# Patient Record
Sex: MALE | Race: WHITE | Employment: UNEMPLOYED | URBAN - METROPOLITAN AREA
[De-identification: names, ages, dates, MRNs, and addresses within clinical notes are randomized per-mention and may not be internally consistent; named-entity substitution may affect disease eponyms.]

---

## 2020-01-01 ENCOUNTER — HOSPITAL ENCOUNTER (EMERGENCY)
Age: 50
End: 2020-10-14
Attending: EMERGENCY MEDICINE | Admitting: INTERNAL MEDICINE
Payer: COMMERCIAL

## 2020-01-01 ENCOUNTER — APPOINTMENT (OUTPATIENT)
Dept: GENERAL RADIOLOGY | Age: 50
End: 2020-01-01
Payer: COMMERCIAL

## 2020-01-01 VITALS
TEMPERATURE: 97.5 F | HEART RATE: 73 BPM | OXYGEN SATURATION: 97 % | DIASTOLIC BLOOD PRESSURE: 35 MMHG | SYSTOLIC BLOOD PRESSURE: 65 MMHG | WEIGHT: 120 LBS

## 2020-01-01 LAB
ABO/RH: NORMAL
ABSOLUTE EOS #: 0.24 K/UL (ref 0–0.44)
ABSOLUTE IMMATURE GRANULOCYTE: 4.48 K/UL (ref 0–0.3)
ABSOLUTE LYMPH #: 2.36 K/UL (ref 1.1–3.7)
ABSOLUTE MONO #: 0.94 K/UL (ref 0.1–1.2)
ALBUMIN SERPL-MCNC: 2.4 G/DL (ref 3.5–5.2)
ALBUMIN/GLOBULIN RATIO: ABNORMAL (ref 1–2.5)
ALP BLD-CCNC: 354 U/L (ref 40–129)
ALT SERPL-CCNC: 30 U/L (ref 5–41)
ANION GAP SERPL CALCULATED.3IONS-SCNC: 22 MMOL/L (ref 9–17)
ANTIBODY SCREEN: NEGATIVE
ARM BAND NUMBER: NORMAL
AST SERPL-CCNC: 153 U/L
BASOPHILS # BLD: 1 % (ref 0–2)
BASOPHILS ABSOLUTE: 0.24 K/UL (ref 0–0.2)
BILIRUB SERPL-MCNC: 2.77 MG/DL (ref 0.3–1.2)
BNP INTERPRETATION: ABNORMAL
BUN BLDV-MCNC: 79 MG/DL (ref 6–20)
BUN/CREAT BLD: 33 (ref 9–20)
CALCIUM SERPL-MCNC: 5.3 MG/DL (ref 8.6–10.4)
CHLORIDE BLD-SCNC: 89 MMOL/L (ref 98–107)
CO2: 20 MMOL/L (ref 20–31)
CREAT SERPL-MCNC: 2.36 MG/DL (ref 0.7–1.2)
DIFFERENTIAL TYPE: ABNORMAL
EKG ATRIAL RATE: 78 BPM
EKG Q-T INTERVAL: 434 MS
EKG QRS DURATION: 88 MS
EKG QTC CALCULATION (BAZETT): 536 MS
EKG R AXIS: 49 DEGREES
EKG T AXIS: 18 DEGREES
EKG VENTRICULAR RATE: 92 BPM
EOSINOPHILS RELATIVE PERCENT: 1 % (ref 1–4)
EXPIRATION DATE: NORMAL
FIO2: 100
GFR AFRICAN AMERICAN: 36 ML/MIN
GFR NON-AFRICAN AMERICAN: 30 ML/MIN
GFR SERPL CREATININE-BSD FRML MDRD: ABNORMAL ML/MIN/{1.73_M2}
GFR SERPL CREATININE-BSD FRML MDRD: ABNORMAL ML/MIN/{1.73_M2}
GLUCOSE BLD-MCNC: 140 MG/DL (ref 70–99)
HCO3 VENOUS: 20.7 MMOL/L (ref 24–30)
HCT VFR BLD CALC: 24 % (ref 40.7–50.3)
HEMOGLOBIN: 6.6 G/DL (ref 13–17)
IMMATURE GRANULOCYTES: 19 %
LACTIC ACID, SEPSIS WHOLE BLOOD: ABNORMAL MMOL/L (ref 0.5–1.9)
LACTIC ACID, SEPSIS WHOLE BLOOD: ABNORMAL MMOL/L (ref 0.5–1.9)
LACTIC ACID, SEPSIS: 5 MMOL/L (ref 0.5–1.9)
LACTIC ACID, SEPSIS: 7.2 MMOL/L (ref 0.5–1.9)
LYMPHOCYTES # BLD: 10 % (ref 24–43)
MAGNESIUM: 2.7 MG/DL (ref 1.6–2.6)
MCH RBC QN AUTO: 25.3 PG (ref 25.2–33.5)
MCHC RBC AUTO-ENTMCNC: 27.5 G/DL (ref 28.4–34.8)
MCV RBC AUTO: 92 FL (ref 82.6–102.9)
MONOCYTES # BLD: 4 % (ref 3–12)
MORPHOLOGY: ABNORMAL
MORPHOLOGY: ABNORMAL
NEGATIVE BASE EXCESS, VEN: 8 (ref 0–2)
NRBC AUTOMATED: 5.2 PER 100 WBC
O2 DEVICE/FLOW/%: ABNORMAL
O2 SAT, VEN: 61 %
PATIENT TEMP: 35.4
PCO2, VEN: 60 MM HG (ref 39–55)
PDW BLD-RTO: 21.3 % (ref 11.8–14.4)
PH VENOUS: 7.15 (ref 7.32–7.42)
PLATELET # BLD: 76 K/UL (ref 138–453)
PLATELET ESTIMATE: ABNORMAL
PMV BLD AUTO: 12 FL (ref 8.1–13.5)
PO2, VEN: 42 MM HG (ref 30–50)
POC PCO2 TEMP: 56 MM HG
POC PH TEMP: 7.17
POC PO2 TEMP: 37 MM HG
POSITIVE BASE EXCESS, VEN: ABNORMAL (ref 0–2)
POTASSIUM SERPL-SCNC: 5.6 MMOL/L (ref 3.7–5.3)
PRO-BNP: 8135 PG/ML
RBC # BLD: 2.61 M/UL (ref 4.21–5.77)
RBC # BLD: ABNORMAL 10*6/UL
SARS-COV-2, RAPID: NOT DETECTED
SARS-COV-2: NORMAL
SARS-COV-2: NORMAL
SEG NEUTROPHILS: 65 % (ref 36–65)
SEGMENTED NEUTROPHILS ABSOLUTE COUNT: 15.34 K/UL (ref 1.5–8.1)
SODIUM BLD-SCNC: 131 MMOL/L (ref 135–144)
SOURCE: NORMAL
TOTAL CO2, VENOUS: 23 MMOL/L (ref 25–31)
TOTAL PROTEIN: 5.7 G/DL (ref 6.4–8.3)
TROPONIN INTERP: ABNORMAL
TROPONIN INTERP: NORMAL
TROPONIN T: ABNORMAL NG/ML
TROPONIN T: NORMAL NG/ML
TROPONIN, HIGH SENSITIVITY: 21 NG/L (ref 0–22)
TROPONIN, HIGH SENSITIVITY: 24 NG/L (ref 0–22)
WBC # BLD: 23.6 K/UL (ref 3.5–11.3)
WBC # BLD: ABNORMAL 10*3/UL

## 2020-01-01 PROCEDURE — 83735 ASSAY OF MAGNESIUM: CPT

## 2020-01-01 PROCEDURE — 2580000003 HC RX 258: Performed by: EMERGENCY MEDICINE

## 2020-01-01 PROCEDURE — 82330 ASSAY OF CALCIUM: CPT

## 2020-01-01 PROCEDURE — 86850 RBC ANTIBODY SCREEN: CPT

## 2020-01-01 PROCEDURE — 96365 THER/PROPH/DIAG IV INF INIT: CPT

## 2020-01-01 PROCEDURE — 99291 CRITICAL CARE FIRST HOUR: CPT

## 2020-01-01 PROCEDURE — 87086 URINE CULTURE/COLONY COUNT: CPT

## 2020-01-01 PROCEDURE — 86900 BLOOD TYPING SEROLOGIC ABO: CPT

## 2020-01-01 PROCEDURE — 87186 SC STD MICRODIL/AGAR DIL: CPT

## 2020-01-01 PROCEDURE — 83880 ASSAY OF NATRIURETIC PEPTIDE: CPT

## 2020-01-01 PROCEDURE — 87205 SMEAR GRAM STAIN: CPT

## 2020-01-01 PROCEDURE — 84484 ASSAY OF TROPONIN QUANT: CPT

## 2020-01-01 PROCEDURE — 96367 TX/PROPH/DG ADDL SEQ IV INF: CPT

## 2020-01-01 PROCEDURE — 86901 BLOOD TYPING SEROLOGIC RH(D): CPT

## 2020-01-01 PROCEDURE — U0002 COVID-19 LAB TEST NON-CDC: HCPCS

## 2020-01-01 PROCEDURE — 87147 CULTURE TYPE IMMUNOLOGIC: CPT

## 2020-01-01 PROCEDURE — 71045 X-RAY EXAM CHEST 1 VIEW: CPT

## 2020-01-01 PROCEDURE — 86403 PARTICLE AGGLUT ANTBDY SCRN: CPT

## 2020-01-01 PROCEDURE — 93005 ELECTROCARDIOGRAM TRACING: CPT | Performed by: EMERGENCY MEDICINE

## 2020-01-01 PROCEDURE — 2500000003 HC RX 250 WO HCPCS: Performed by: EMERGENCY MEDICINE

## 2020-01-01 PROCEDURE — 81001 URINALYSIS AUTO W/SCOPE: CPT

## 2020-01-01 PROCEDURE — 6360000002 HC RX W HCPCS: Performed by: EMERGENCY MEDICINE

## 2020-01-01 PROCEDURE — 36600 WITHDRAWAL OF ARTERIAL BLOOD: CPT

## 2020-01-01 PROCEDURE — 85025 COMPLETE CBC W/AUTO DIFF WBC: CPT

## 2020-01-01 PROCEDURE — 80053 COMPREHEN METABOLIC PANEL: CPT

## 2020-01-01 PROCEDURE — 83605 ASSAY OF LACTIC ACID: CPT

## 2020-01-01 PROCEDURE — 96361 HYDRATE IV INFUSION ADD-ON: CPT

## 2020-01-01 PROCEDURE — 94770 HC ETCO2 MONITOR DAILY: CPT

## 2020-01-01 PROCEDURE — 87040 BLOOD CULTURE FOR BACTERIA: CPT

## 2020-01-01 PROCEDURE — 96368 THER/DIAG CONCURRENT INF: CPT

## 2020-01-01 PROCEDURE — 82803 BLOOD GASES ANY COMBINATION: CPT

## 2020-01-01 RX ORDER — MORPHINE SULFATE 2 MG/ML
2 INJECTION, SOLUTION INTRAMUSCULAR; INTRAVENOUS
Status: DISCONTINUED | OUTPATIENT
Start: 2020-01-01 | End: 2020-01-01 | Stop reason: HOSPADM

## 2020-01-01 RX ORDER — MORPHINE SULFATE 4 MG/ML
4 INJECTION, SOLUTION INTRAMUSCULAR; INTRAVENOUS
Status: DISCONTINUED | OUTPATIENT
Start: 2020-01-01 | End: 2020-01-01 | Stop reason: HOSPADM

## 2020-01-01 RX ORDER — ONDANSETRON 2 MG/ML
4 INJECTION INTRAMUSCULAR; INTRAVENOUS EVERY 6 HOURS PRN
Status: DISCONTINUED | OUTPATIENT
Start: 2020-01-01 | End: 2020-01-01 | Stop reason: HOSPADM

## 2020-01-01 RX ORDER — DEXTROSE MONOHYDRATE 25 G/50ML
25 INJECTION, SOLUTION INTRAVENOUS ONCE
Status: DISCONTINUED | OUTPATIENT
Start: 2020-01-01 | End: 2020-01-01

## 2020-01-01 RX ORDER — SODIUM CHLORIDE 0.9 % (FLUSH) 0.9 %
10 SYRINGE (ML) INJECTION PRN
Status: CANCELLED | OUTPATIENT
Start: 2020-01-01

## 2020-01-01 RX ORDER — ACETAMINOPHEN 325 MG/1
650 TABLET ORAL EVERY 6 HOURS PRN
Status: CANCELLED | OUTPATIENT
Start: 2020-01-01

## 2020-01-01 RX ORDER — PROMETHAZINE HYDROCHLORIDE 25 MG/1
12.5 TABLET ORAL EVERY 6 HOURS PRN
Status: CANCELLED | OUTPATIENT
Start: 2020-01-01

## 2020-01-01 RX ORDER — 0.9 % SODIUM CHLORIDE 0.9 %
1000 INTRAVENOUS SOLUTION INTRAVENOUS ONCE
Status: COMPLETED | OUTPATIENT
Start: 2020-01-01 | End: 2020-01-01

## 2020-01-01 RX ORDER — ACETAMINOPHEN 650 MG/1
650 SUPPOSITORY RECTAL EVERY 6 HOURS PRN
Status: CANCELLED | OUTPATIENT
Start: 2020-01-01

## 2020-01-01 RX ORDER — POLYETHYLENE GLYCOL 3350 17 G/17G
17 POWDER, FOR SOLUTION ORAL DAILY PRN
Status: CANCELLED | OUTPATIENT
Start: 2020-01-01

## 2020-01-01 RX ORDER — 0.9 % SODIUM CHLORIDE 0.9 %
250 INTRAVENOUS SOLUTION INTRAVENOUS ONCE
Status: COMPLETED | OUTPATIENT
Start: 2020-01-01 | End: 2020-01-01

## 2020-01-01 RX ORDER — DOPAMINE HYDROCHLORIDE 160 MG/100ML
2.5 INJECTION, SOLUTION INTRAVENOUS CONTINUOUS
Status: DISCONTINUED | OUTPATIENT
Start: 2020-01-01 | End: 2020-01-01

## 2020-01-01 RX ORDER — NICOTINE 21 MG/24HR
1 PATCH, TRANSDERMAL 24 HOURS TRANSDERMAL DAILY PRN
Status: CANCELLED | OUTPATIENT
Start: 2020-01-01

## 2020-01-01 RX ORDER — GLYCOPYRROLATE 1 MG/5 ML
0.2 SYRINGE (ML) INTRAVENOUS EVERY 4 HOURS PRN
Status: DISCONTINUED | OUTPATIENT
Start: 2020-01-01 | End: 2020-01-01 | Stop reason: HOSPADM

## 2020-01-01 RX ORDER — ONDANSETRON 2 MG/ML
4 INJECTION INTRAMUSCULAR; INTRAVENOUS EVERY 6 HOURS PRN
Status: CANCELLED | OUTPATIENT
Start: 2020-01-01

## 2020-01-01 RX ORDER — LORAZEPAM 2 MG/ML
1 INJECTION INTRAMUSCULAR
Status: DISCONTINUED | OUTPATIENT
Start: 2020-01-01 | End: 2020-01-01 | Stop reason: HOSPADM

## 2020-01-01 RX ORDER — SODIUM CHLORIDE 0.9 % (FLUSH) 0.9 %
10 SYRINGE (ML) INJECTION EVERY 12 HOURS SCHEDULED
Status: CANCELLED | OUTPATIENT
Start: 2020-01-01

## 2020-01-01 RX ADMIN — CALCIUM GLUCONATE 1 G: 98 INJECTION, SOLUTION INTRAVENOUS at 18:31

## 2020-01-01 RX ADMIN — Medication 2 MCG/MIN: at 17:23

## 2020-01-01 RX ADMIN — PIPERACILLIN SODIUM AND TAZOBACTAM SODIUM 4.5 G: 4; .5 INJECTION, POWDER, LYOPHILIZED, FOR SOLUTION INTRAVENOUS at 18:32

## 2020-01-01 RX ADMIN — LORAZEPAM 1 MG: 2 INJECTION, SOLUTION INTRAMUSCULAR; INTRAVENOUS at 22:16

## 2020-01-01 RX ADMIN — VANCOMYCIN HYDROCHLORIDE 1500 MG: 1 INJECTION, POWDER, LYOPHILIZED, FOR SOLUTION INTRAVENOUS at 19:48

## 2020-01-01 RX ADMIN — MORPHINE SULFATE 4 MG: 4 INJECTION, SOLUTION INTRAMUSCULAR; INTRAVENOUS at 22:17

## 2020-01-01 RX ADMIN — SODIUM CHLORIDE 1000 ML: 9 INJECTION, SOLUTION INTRAVENOUS at 17:25

## 2020-01-01 RX ADMIN — Medication 1 MG/HR: at 17:35

## 2020-01-01 RX ADMIN — MORPHINE SULFATE 4 MG: 4 INJECTION, SOLUTION INTRAMUSCULAR; INTRAVENOUS at 22:33

## 2020-01-01 RX ADMIN — Medication 0.2 MG: at 22:34

## 2020-01-01 RX ADMIN — SODIUM CHLORIDE 1000 ML: 9 INJECTION, SOLUTION INTRAVENOUS at 19:32

## 2020-01-01 RX ADMIN — SODIUM CHLORIDE 250 ML: 0.9 INJECTION, SOLUTION INTRAVENOUS at 18:41

## 2020-01-01 SDOH — HEALTH STABILITY: MENTAL HEALTH: HOW OFTEN DO YOU HAVE A DRINK CONTAINING ALCOHOL?: NEVER

## 2020-01-01 ASSESSMENT — PULMONARY FUNCTION TESTS
PIF_VALUE: 13
PIF_VALUE: 32

## 2020-10-13 PROBLEM — D64.9 ANEMIA: Status: ACTIVE | Noted: 2020-01-01

## 2020-10-13 PROBLEM — C79.51 SECONDARY MALIGNANT NEOPLASM OF BONE (HCC): Status: ACTIVE | Noted: 2019-09-20

## 2020-10-13 PROBLEM — E87.5 HYPERKALEMIA: Status: ACTIVE | Noted: 2020-01-01

## 2020-10-13 PROBLEM — E87.1 HYPONATREMIA: Status: ACTIVE | Noted: 2020-01-01

## 2020-10-13 PROBLEM — J18.9 PNEUMONIA DUE TO INFECTIOUS ORGANISM: Status: ACTIVE | Noted: 2020-01-01

## 2020-10-13 PROBLEM — E83.52 HYPERCALCEMIA: Status: ACTIVE | Noted: 2019-01-01

## 2020-10-13 PROBLEM — C64.1 RENAL CELL CARCINOMA OF RIGHT KIDNEY (HCC): Status: ACTIVE | Noted: 2019-09-09

## 2020-10-13 PROBLEM — E43 SEVERE MALNUTRITION (HCC): Status: ACTIVE | Noted: 2019-09-13

## 2020-10-13 PROBLEM — J96.90 RESPIRATORY FAILURE (HCC): Status: ACTIVE | Noted: 2020-01-01

## 2020-10-13 PROBLEM — J96.00 ACUTE RESPIRATORY FAILURE (HCC): Status: ACTIVE | Noted: 2020-01-01

## 2020-10-13 PROBLEM — R65.11 SIRS WITHOUT INFECTION WITH ORGAN DYSFUNCTION (HCC): Status: ACTIVE | Noted: 2020-01-01

## 2020-10-13 NOTE — ED PROVIDER NOTES
EMERGENCY DEPARTMENT ENCOUNTER    Pt Name: Judy Lawton  MRN: [id number]  [place] 1970  Date of evaluation: 10/13/20  CHIEF COMPLAINT     Unresponsiveness. HISTORY OF PRESENT ILLNESS   Patient is a 47-year-old male with history of terminal renal cancer brought in by EMS intubated. Patient reportedly had not been feeling well for the past couple days and family had been talking with hospice regarding possible transition to hospice care yesterday as well as a DNR. His cancer has reportedly metastasized to liver and bones. They had not yet come to a decision. The patient reportedly initially had chemo which helped but now he is on his third different chemo regimen and is oncologist had told him that he is now terminal.  When EMS arrived patient had agonal breathing and was unresponsive with a thready pulse. Sugar was reportedly normal.  No reported trauma. REVIEW OF SYSTEMS     Review of Systems   Unable to perform ROS: Acuity of condition     PASTMEDICAL HISTORY     Past Medical History:   Diagnosis Date    Cancer ([facility] [address]     Hypertension      SURGICAL HISTORY     History reviewed. No pertinent surgical history. CURRENT MEDICATIONS       Previous Medications    No medications on file     ALLERGIES     has No Known Allergies. FAMILY HISTORY     has no family status information on file. SOCIAL HISTORY       Social History     Tobacco Use    Smoking status: Former Smoker   Substance Use Topics    Alcohol use: Never     Frequency: Never    Drug use: Not on file     PHYSICAL EXAM     INITIAL VITALS: BP (!) 85/53   Pulse 88   Temp 97.5 °F (36.4 °C) (Rectal)   Resp 26   Wt 120 lb (54.4 kg)   SpO2 100%    Physical Exam  Vitals signs reviewed. Constitutional:       Appearance: He is ill-appearing, toxic-appearing and diaphoretic. Interventions: He is intubated. HENT:      Head: Normocephalic and atraumatic.       Comments: No raccoon eyes or khoury sign     Mouth/Throat:      Mouth: Mucous membranes are moist.   Eyes:      General: No scleral icterus. Pupils: Pupils are equal, round, and reactive to light. Comments: Pupils are about 5 bilaterally millimeters and sluggishly reactive. Corneal reflexes intact   Neck:      Comments: C-collar in place. Trachea midline. Cardiovascular:      Rate and Rhythm: Normal rate and regular rhythm. Pulses: Normal pulses. Heart sounds: Normal heart sounds. Comments: 2 intraosseous line needs to the bilateral tibias  Pulmonary:      Effort: He is intubated. Breath sounds: Rhonchi and rales present. Comments: Ventilator breath sounds bilaterally  Abdominal:      General: There is no distension. Palpations: Abdomen is soft. There is no mass. Musculoskeletal:         General: Swelling present. Right lower leg: Edema present. Left lower leg: Edema present. Comments: No step-offs or deformities to the cervical spine. Skin:     General: Skin is cool. Capillary Refill: Capillary refill takes 2 to 3 seconds. Coloration: Skin is pale. Neurological:      Mental Status: He is unresponsive. GCS: GCS eye subscore is 4. GCS verbal subscore is 1. GCS motor subscore is 1. Motor: No tremor or seizure activity. MEDICAL DECISION MAKING:          Please see ED Course below for MDM/ED course. DDx: PE, organ failure, CHF, infection, metabolic disturbance, intracranial bleed    All patient's question's and concerns were answered prior to disposition and patient and/or family expressed understanding and agreement of treatment plan. CRITICAL CARE:   CRITICAL CARE: There was a high probability of clinically significant/life threatening deterioration in this patient's condition which required my urgent intervention. Total critical care time was 32 minutes. This excludes any time for separately reportable procedures.         NIH STROKE SCALE: PROCEDURES:    Procedures    DIAGNOSTIC RESULTS   EKG:All EKG's are interpreted by the Emergency Department Physician who either signs or Co-signs this chart in the absence of a cardiologist.    Accelerated junctional rhythm with PVCs rate of 92 QRS 88  normal axis nonspecific ST and T wave changes no Q waves, good R wave progression, abnormal EKG    RADIOLOGY:All plain film, CT, MRI, and formal ultrasound images (except ED bedside ultrasound) are read by the radiologist, see reports below, unless otherwisenoted in MDM or here. XR CHEST 1 VIEW   Final Result   Endotracheal tube residing at the level of the thoracic inlet. This could be   advanced 3 cm for better positioning. Findings suggest airspace consolidation in the right lower lung field. Interstitial process, possibly fluid and/or fibrosis is also present. LABS: All lab results were reviewed by myself, and all abnormals are listed below.   Labs Reviewed   CBC WITH AUTO DIFFERENTIAL - Abnormal; Notable for the following components:       Result Value    WBC 23.6 (*)     RBC 2.61 (*)     Hemoglobin 6.6 (*)     Hematocrit 24.0 (*)     MCHC 27.5 (*)     RDW 21.3 (*)     Platelets 76 (*)     NRBC Automated 5.2 (*)     Lymphocytes 10 (*)     Immature Granulocytes 19 (*)     Segs Absolute 15.34 (*)     Basophils Absolute 0.24 (*)     Absolute Immature Granulocyte 4.48 (*)     All other components within normal limits   COMPREHENSIVE METABOLIC PANEL - Abnormal; Notable for the following components:    Glucose 140 (*)     BUN 79 (*)     CREATININE 2.36 (*)     Bun/Cre Ratio 33 (*)     Calcium 5.3 (*)     Sodium 131 (*)     Potassium 5.6 (*)     Chloride 89 (*)     Anion Gap 22 (*)     Alkaline Phosphatase 354 (*)      (*)     Total Bilirubin 2.77 (*)     Total Protein 5.7 (*)     Alb 2.4 (*)     GFR Non- 30 (*)     GFR  36 (*)     All other components within normal limits   MAGNESIUM - Abnormal; Notable for the following components:    Magnesium 2.7 (*)     All other components within normal limits   TROPONIN - Abnormal; Notable for the following components:    Troponin, High Sensitivity 24 (*)     All other components within normal limits   BRAIN NATRIURETIC PEPTIDE - Abnormal; Notable for the following components:    Pro-BNP 8,135 (*)     All other components within normal limits   LACTATE, SEPSIS - Abnormal; Notable for the following components:    Lactic Acid, Sepsis 7.2 (*)     All other components within normal limits   LACTATE, SEPSIS - Abnormal; Notable for the following components:    Lactic Acid, Sepsis 5.0 (*)     All other components within normal limits   POC PANEL (G3)-GIL - Abnormal; Notable for the following components:    pH, Gil 7.15 (*)     pCO2, Gil 60 (*)     Total CO2, Venous 23 (*)     HCO3, Venous 20.7 (*)     Negative Base Excess, Gil 8 (*)     All other components within normal limits   CULTURE, URINE   CULTURE, BLOOD 1   CULTURE, BLOOD 1   TROPONIN   COVID-19   URINALYSIS WITH MICROSCOPIC   CALCIUM, IONIZED   TYPE AND SCREEN       EMERGENCY DEPARTMENTCOURSE:     Patient is a 53-year-old male here after respiratory distress intubated by EMS. Apparently has been feeling well for the past few days has terminal kidney cancer per the family and was discussing hospice care. On exam he is pale he is cool he is diaphoretic he is intubated he is only 88% on 100% oxygen he is hypotensive currently on dopamine drip from EMS, will transition just to Levophed. He has bilateral ventilated breath sounds and diffuse rhonchi and crackles. He has a c-collar in place. His pupils are equal and sluggishly reactive with corneal reflexes intact. No signs of basilar skull fracture. No chest deformities or abdominal distention or pulsatile mass. Lower extremities very edematous bilaterally. He has IO was in place to both tibias.   Impression is shock, respiratory failure, will check cardiac metabolic and infectious work-up, will speak with family regarding their wishes regarding the patient's care and further disposition. Consider CT chest given he is cancer and hypotensive as well as CT head. Spoke with the wife and the son and updated them on the situation. They would like to see the patient and also speak with the rest of the family before they make further decisions regarding his care. We will start Levophed drip, patient is starting to wake up and move more will start Versed. 5:11 PM EDT  Patient is anemic at 6.6. Will type and screen. Will order CT chest to rule out PE as well as CT head given his unresponsiveness. Will order blood cultures as his white count is 23. Patient's pH is 7.14.    6:54 PM EDT  Patient is waking up and breathing on his own over the vent. Family updated on condition. They would like to continue with full code and full management. Antibiotics ordered for possible pneumonia. Code swab ordered as well. They did prefer [facility] however they have no beds as well as [facility].  Will transfer to [facility] while they can teach you to make further decisions regarding patient's care. CT chest and abdomen pelvis discontinued as patient's creatinine is quite elevated. Spoke with Dr. Lissette Barros who conditionally accepts when they have a bed available. 8:09 PM EDT  Patient's creatinine as well as bilirubin and liver enzymes are up as well as acidosis. Patient appears to be in multiorgan failure. I did have another conversation with the wife and son regarding this as the patient will likely need a central line for pressor use and currently the patient is actually awake and asking to have the tube removed. I updated them on this and they are going to see the patient and decide on further care.     8:52 PM EDT  Further conversation with wife and they have decided to make the patient DNR CC given that the patient is stating that he does not want the tube assistance of a speech-recognition program. While intending to generate a document that actually reflects the content of the visit, no guarantees can be provided that every mistake has been identified and corrected by editing.                    Lisbeth Reeder MD  10/13/20 8210

## 2020-10-13 NOTE — PROGRESS NOTES
Pharmacy dosed initial ED vancomycin. 1500 mg given x 1. Waiting to see if vancomycin is continued or will be stopped/changed. Leslye Martinez.  Ginette Paz PharmD  Emergency Department and Critical Care Pharmacist

## 2020-10-13 NOTE — ED NOTES
Pts wife states pt was dx with cancer about 1 year ago. pts wife states pt had not been well the past week, c/o SOB and in and out of consciousness the past few days. Pts wife states they had appt with hospice yesterday but did not make any final decisions about code status since the appt yesterday. Pts wife states that pts symptoms worsened today so she called EMS.      Samantha Robin RN  10/13/20 1196

## 2020-10-13 NOTE — FLOWSHEET NOTE
Writer back to Emergency Department for follow up and support. Per staff and family, patient is to be transferred to another facility for ICU care. There are numerous family members present in waiting room (wife, son, mother, brothers, etc.) . Writer asists with rotating family members in to room to see patient. Ongoing support offered. Wife is tearful as she shares her understanding of the severity of the patient's condition, and states that \"we never talked about what he would want exactly. \" Silverio Ovalle encourages wife to be in conversation with family about plans for care and support. Family is tearful,m but coping and offering one another support. Wife and son thank Vero Jarrell for support and conversation. Spiritual Care will follow as needed. 10/13/20 4659   Encounter Summary   Services provided to: Family   Referral/Consult From: [facility]   (10/13/20)   Complexity of Encounter Moderate   Length of Encounter 30 minutes   Spiritual Assessment Completed Yes   Routine   Type Follow up   Crisis   Type Emotional distress   Assessment Anxious; Tearful;Coping   Intervention Active listening;Explored feelings, thoughts, concerns; Discussed illness/injury and it's impact;Sustaining presence/ Ministry of presence   Outcome Engaged in conversation;Expressed feelings/needs/concerns; Tearful;Coping;Expressed gratitude

## 2020-10-14 NOTE — FLOWSHEET NOTE
RN requests writer return for family support. Writer visits with patient's wife, son, and daughter in family room. They express their stress and sadness at the patient's condition, and consider what his wishes might be with regard to ongoing care. Dr Alcides Shelton and Lily Whatley, RN and writer visit with family. Updates are given, and family decides to speak with patient's mother and other family to make a decision regarding ongoing measures. Family appears to understand the severity of the patient's condition, and wife expresses that she \"doesn't want to see him suffer. \"    Much support offered. Family expresses thanks. Writer will notify night  of situation. 10/13/20 2009   Encounter Summary   Services provided to: Family   Referral/Consult From: Nurse   Support System Spouse; Children;Family members   Continue Visiting   (10/13/20)   Complexity of Encounter Moderate   Length of Encounter 30 minutes   Spiritual Assessment Completed Yes   Routine   Type Follow up   Crisis   Type Emotional distress; Follow up   Assessment Tearful; Anxious; Anticipatory grief   Intervention Active listening;Explored feelings, thoughts, concerns;Explored coping resources; Discussed death;Sustaining presence/ Ministry of presence   Outcome Engaged in conversation;Expressed feelings/needs/concerns; Tearful;Expressed gratitude

## 2020-10-14 NOTE — ED NOTES
Pt family decided to make pt a [facility] and pt will be extubated      Cristina Miner RN  10/13/20 2052

## 2020-10-14 NOTE — FLOWSHEET NOTE
Writer back to unit for support. Family has decided to terminally wean. Writer, Dr Umer Cevallos, and Irving Gonzalez, RN met with family to discuss terminal wean, end of life, etc. Family is tearful and grieving, but coping appropriately and supporting one another. Family members will say their goodbyes before terminal wean. Much support offered. Family expresses gratitude for the support they have received and the care the patient has received. Writer informs next shift  of plan of care. 10/13/20 2046   Encounter Summary   Services provided to: Family   Referral/Consult From: [address] [place] [address]; Family members; Children;Parent   Continue Visiting   (10/13/20)   Complexity of Encounter Moderate   Length of Encounter 30 minutes   Spiritual Assessment Completed Yes   Routine   Type Follow up   Grief and Life Adjustment   Type End of life   Assessment Approachable;Tearful; Anxious; Anticipatory grief;Coping   Intervention Active listening;Explored feelings, thoughts, concerns; End of life care; Discussed illness/injury and it's impact;Sustaining presence/ Ministry of presence   Outcome Engaged in conversation;Expressed feelings/needs/concerns; Tearful;Coping;Comfort;Expressed gratitude

## 2020-10-14 NOTE — PROGRESS NOTES
Patient extubated per physician order. Patient extubated in usual fashion. Patient placed on  2 liters/min via nasal cannula.      [facility]   10:20pm

## 2020-10-14 NOTE — ED NOTES
Writer spoke with Nimo at [facility]. [facility] needs to be called back when pt passes. Referral number- [id number].      Cynthia Jo RN  10/13/20 9039

## 2020-10-14 NOTE — ED NOTES
Report given to [facility] RN on MedSurg.      Norris Evans RN  10/13/20 Matty Trejo RN  10/13/20 2019

## 2020-10-14 NOTE — ED NOTES
Writer spoke with family. Pt did not have a PCP and just was following with oncology and [facility].       Umang Bansal RN  10/14/20 2136

## 2020-10-14 NOTE — FLOWSHEET NOTE
was pre briefed by chaplain Sanchez before being called in.  called in for death at 11:30 pm.  TOD pronounced by Dr. Jennifer Meneses 2305 pm.    Present was spouse Gema + 4 other family members. Family showed normal grieving. RN Mendel Kayser contacts , body was released. [facility] were called,  not eligible. Body is to be released to [facility]. Family was calm, sad no major needs expressed. Family grateful to the staff.  to send sympathy note to family. 10/14/20 0020   Encounter Summary   Services provided to: Family   Referral/Consult From: Multi-disciplinary team   Support System Children;Family members;Spouse   Continue Visiting   (10-14-20)   Complexity of Encounter Moderate   Length of Encounter 45 minutes   Spiritual Assessment Completed Yes   Routine   Type Follow up   Grief and Life Adjustment   Type Death   Assessment Passive; Tearful;Grieving;Calm; Approachable   Intervention Explored feelings, thoughts, concerns;Sustaining presence/ Ministry of presence; Discussed belief system/Jewish practices/chris;Discussed illness/injury and it's impact   Outcome Expressed gratitude;Receptive;Engaged in conversation;Expressed feelings/needs/concerns

## 2020-10-14 NOTE — ED NOTES
Dr. Jayant Silva in family waiting speaking with wife and family about critical condition of patient. Family to bedside speaking about what decisions to make.       Umang Bansal RN  10/13/20 2008

## 2020-10-14 NOTE — ED NOTES
Spoke with Dr. Raheem Stoner covering for [facility] with hemo/Oncology. Paul would like writer to contact [facility] about signing death certificate. [facility] St[facility].       Sixto Zavala RN  10/13/20 8121

## 2020-10-16 LAB
CULTURE: ABNORMAL
Lab: ABNORMAL
Lab: ABNORMAL
SPECIMEN DESCRIPTION: ABNORMAL
SPECIMEN DESCRIPTION: ABNORMAL